# Patient Record
Sex: MALE | Race: OTHER | HISPANIC OR LATINO | URBAN - METROPOLITAN AREA
[De-identification: names, ages, dates, MRNs, and addresses within clinical notes are randomized per-mention and may not be internally consistent; named-entity substitution may affect disease eponyms.]

---

## 2020-03-29 ENCOUNTER — EMERGENCY (EMERGENCY)
Facility: HOSPITAL | Age: 35
LOS: 1 days | Discharge: HOME | End: 2020-03-29
Attending: INTERNAL MEDICINE | Admitting: INTERNAL MEDICINE
Payer: MEDICAID

## 2020-03-29 VITALS
TEMPERATURE: 100 F | SYSTOLIC BLOOD PRESSURE: 122 MMHG | OXYGEN SATURATION: 99 % | HEART RATE: 61 BPM | WEIGHT: 237 LBS | DIASTOLIC BLOOD PRESSURE: 74 MMHG | RESPIRATION RATE: 18 BRPM

## 2020-03-29 PROCEDURE — 99283 EMERGENCY DEPT VISIT LOW MDM: CPT

## 2020-03-29 NOTE — ED ADULT TRIAGE NOTE - CHIEF COMPLAINT QUOTE
onset 1 week ago. c/o fever > resolved.  slight cough, sense of smell diminished  wants to be tested. wife is also a pt here now.

## 2020-03-29 NOTE — ED PROVIDER NOTE - NSFOLLOWUPINSTRUCTIONS_ED_ALL_ED_FT
Please contact your primary medical doctor within two days to schedule follow-up.  You likely have the novel COVID-19 illness and should remain quarantined until afebrile or otherwise asymptomatic for 72 hours.  Take Tylenol 500mg orally every six hours as needed for fever, take no more than 2000mg per day.  If you have taken Tylenol frequently for four days then allow a day of rest in which you do not take Tylenol in order to allow your body to process the drug.  After that you may resume taking Tylenol as needed then take a break after another four days.  Return to the emergency department if you feel that your condition is worsening.  Patients with this condition can become ill after their initial symptoms and your should return immediately if you feel you develop any difficulty breathing, chest pain, nausea or vomiting.

## 2020-03-29 NOTE — ED PROVIDER NOTE - NS ED ROS FT
Constitutional: + fever or Chills.  No unexpected weight loss.  Head, Eyes, Ears, Nose, Throat: No visual changes.  No tongue or throat swelling or pain.  Neck: No neck stiffness.  Pulmonary: + shortness of breath, cough.  No wheezing.  Cardiovascular: No chest pain, palpitations, or diaphoresis.  Abdominal: No abdominal pain. No nausea, vomiting, diarrhea.  No bloody or melenic stools.  Genitourinary: No dysuria or hematuria.  No discharge.  Endocrine: No frequent urination or unusual thirst.  No hair or skin changes.  Hematologic: No lymph node swelling, easy bruising, or bleeding.  Dermatologic: No rashes.  Allergic: No new exposures, mucosal swelling, or pruritis.  Neurologic: No headache, weakness, visual changes, speech changes, or sensory abnormalities.  No fainting episodes.  No gait imbalance.  Psychiatric: No depression or insomnia.

## 2020-03-29 NOTE — ED PROVIDER NOTE - OBJECTIVE STATEMENT
34 M Denies PMHx P/W dry cough, rhinorrhea present x 1 week.  Preceded by fever lasting 2 days: onset at start of symptoms and now resolved.  Previously SOB several days prior but now feels improved.  No chest pain.  Nausea: now resolved.  No emesis.  Presents for COVID.  States that he feels he is improving.

## 2020-03-29 NOTE — ED PROVIDER NOTE - PATIENT PORTAL LINK FT
You can access the FollowMyHealth Patient Portal offered by [facility] by registering at the following website: [email or web address]. By joining [facility]’s FollowMyHealth portal, you will also be able to view your health information using other applications (apps) compatible with our system.

## 2020-03-29 NOTE — ED PROVIDER NOTE - PHYSICAL EXAMINATION
General: Seated in Stretcher, Awake, Alert, Conversant  Head, Ears, Eyes, Nose, Throat: Pupils equal, round, reactive to light, B/L injected sclera, moist oral mucosa  Neck: Supple  Pulmonary: Breath sounds bilaterally, no increased work of breathing, speaking full sentences  Cardiovascular: Normal and regular heart rate, normal S1/S2, no murmurs, rubs, or gallops  Abdominal: Soft and nontender, no rebound or guarding  Extremities: No lower extremity edema or erythema, nontender  Dermatologic: No rash  Neurologic: Alert and oriented x3, clear and fluent speech, moving all extremities with good strength

## 2020-03-29 NOTE — ED PROVIDER NOTE - CCCP TRG CHIEF CMPLNT
Problem: Dementia-BEHAVIORAL HEALTH (Adult/Pediatric)  Goal: *STG: Remains safe in hospital  Outcome: Progressing Towards Goal  Pt slept 4 hours. Pt remained quiet in bed. No med/beh concerns. Staff will continue to monitor for health and safety. medical evaluation
